# Patient Record
Sex: FEMALE | URBAN - METROPOLITAN AREA
[De-identification: names, ages, dates, MRNs, and addresses within clinical notes are randomized per-mention and may not be internally consistent; named-entity substitution may affect disease eponyms.]

---

## 2020-10-22 ENCOUNTER — APPOINTMENT (RX ONLY)
Dept: URBAN - METROPOLITAN AREA CLINIC 134 | Facility: CLINIC | Age: 55
Setting detail: DERMATOLOGY
End: 2020-10-22

## 2020-10-22 VITALS — TEMPERATURE: 97.4 F

## 2020-10-22 DIAGNOSIS — L82.1 OTHER SEBORRHEIC KERATOSIS: ICD-10-CM

## 2020-10-22 PROCEDURE — ? COUNSELING

## 2020-10-22 PROCEDURE — ? OTHER

## 2020-10-22 PROCEDURE — 99202 OFFICE O/P NEW SF 15 MIN: CPT

## 2020-10-22 ASSESSMENT — LOCATION DETAILED DESCRIPTION DERM
LOCATION DETAILED: LEFT LATERAL BUCCAL CHEEK
LOCATION DETAILED: LEFT LATERAL MANDIBULAR CHEEK

## 2020-10-22 ASSESSMENT — PAIN INTENSITY VAS: HOW INTENSE IS YOUR PAIN 0 BEING NO PAIN, 10 BEING THE MOST SEVERE PAIN POSSIBLE?: NO PAIN

## 2020-10-22 ASSESSMENT — LOCATION SIMPLE DESCRIPTION DERM: LOCATION SIMPLE: LEFT CHEEK

## 2020-10-22 ASSESSMENT — LOCATION ZONE DERM: LOCATION ZONE: FACE

## 2020-10-22 NOTE — PROCEDURE: OTHER
Detail Level: Detailed
Note Text (......Xxx Chief Complaint.): This diagnosis correlates with the
Other (Free Text): Courtesy cryodestruction with LN x 2

## 2021-10-12 ENCOUNTER — APPOINTMENT (RX ONLY)
Dept: URBAN - METROPOLITAN AREA CLINIC 134 | Facility: CLINIC | Age: 56
Setting detail: DERMATOLOGY
End: 2021-10-12

## 2021-10-12 DIAGNOSIS — L82.1 OTHER SEBORRHEIC KERATOSIS: ICD-10-CM

## 2021-10-12 PROBLEM — D48.5 NEOPLASM OF UNCERTAIN BEHAVIOR OF SKIN: Status: ACTIVE | Noted: 2021-10-12

## 2021-10-12 PROCEDURE — 99212 OFFICE O/P EST SF 10 MIN: CPT | Mod: 25

## 2021-10-12 PROCEDURE — 69100 BIOPSY OF EXTERNAL EAR: CPT

## 2021-10-12 PROCEDURE — ? BIOPSY BY SHAVE METHOD

## 2021-10-12 PROCEDURE — ? COUNSELING

## 2021-10-12 ASSESSMENT — LOCATION DETAILED DESCRIPTION DERM: LOCATION DETAILED: RIGHT CENTRAL ZYGOMA

## 2021-10-12 ASSESSMENT — LOCATION SIMPLE DESCRIPTION DERM: LOCATION SIMPLE: RIGHT ZYGOMA

## 2021-10-12 ASSESSMENT — LOCATION ZONE DERM: LOCATION ZONE: FACE

## 2021-10-12 NOTE — PROCEDURE: BIOPSY BY SHAVE METHOD
Detail Level: Detailed
Depth Of Biopsy: dermis
Was A Bandage Applied: Yes
Size Of Lesion In Cm: 1
X Size Of Lesion In Cm: 0
Biopsy Type: H and E
Biopsy Method: Dermablade
Anesthesia Type: 1% lidocaine with epinephrine
Anesthesia Volume In Cc (Will Not Render If 0): 0.5
Hemostasis: Drysol
Wound Care: Petrolatum
Dressing: bandage
Destruction After The Procedure: No
Type Of Destruction Used: Curettage
Curettage Text: The wound bed was treated with curettage after the biopsy was performed.
Cryotherapy Text: The wound bed was treated with cryotherapy after the biopsy was performed.
Electrodesiccation Text: The wound bed was treated with electrodesiccation after the biopsy was performed.
Electrodesiccation And Curettage Text: The wound bed was treated with electrodesiccation and curettage after the biopsy was performed.
Silver Nitrate Text: The wound bed was treated with silver nitrate after the biopsy was performed.
Lab: [id number]
Consent: Verbal consent was obtained and risks were reviewed including but not limited to scarring, infection, bleeding, scabbing, incomplete removal, nerve damage and allergy to anesthesia.
Post-Care Instructions: I reviewed with the patient in detail post-care instructions. Patient is to keep the biopsy site dry overnight, and then apply bacitracin twice daily until healed. Patient may apply hydrogen peroxide soaks to remove any crusting.
Notification Instructions: Patient will be notified of biopsy results. However, patient instructed to call the office if not contacted within 2 weeks.
Billing Type: Third-Party Bill
Information: Selecting Yes will display possible errors in your note based on the variables you have selected. This validation is only offered as a suggestion for you. PLEASE NOTE THAT THE VALIDATION TEXT WILL BE REMOVED WHEN YOU FINALIZE YOUR NOTE. IF YOU WANT TO FAX A PRELIMINARY NOTE YOU WILL NEED TO TOGGLE THIS TO 'NO' IF YOU DO NOT WANT IT IN YOUR FAXED NOTE.

## 2021-10-12 NOTE — PROCEDURE: MIPS QUALITY
Quality 110: Preventive Care And Screening: Influenza Immunization: Influenza Immunization previously received during influenza season
Detail Level: Detailed
Additional Notes: Covid vaccine received

## 2021-10-20 ENCOUNTER — APPOINTMENT (RX ONLY)
Dept: URBAN - METROPOLITAN AREA CLINIC 134 | Facility: CLINIC | Age: 56
Setting detail: DERMATOLOGY
End: 2021-10-20

## 2021-10-20 ENCOUNTER — RX ONLY (OUTPATIENT)
Age: 56
Setting detail: RX ONLY
End: 2021-10-20

## 2021-10-20 DIAGNOSIS — S0182XA OPEN WOUND OF OTHER AND MULTIPLE SITES OF FACE, COMPLICATED: ICD-10-CM

## 2021-10-20 DIAGNOSIS — L01.01 NON-BULLOUS IMPETIGO: ICD-10-CM

## 2021-10-20 PROBLEM — S01.302A UNSPECIFIED OPEN WOUND OF LEFT EAR, INITIAL ENCOUNTER: Status: ACTIVE | Noted: 2021-10-20

## 2021-10-20 PROCEDURE — ? PRESCRIPTION

## 2021-10-20 PROCEDURE — ? COUNSELING

## 2021-10-20 PROCEDURE — 99213 OFFICE O/P EST LOW 20 MIN: CPT

## 2021-10-20 PROCEDURE — ? TREATMENT REGIMEN

## 2021-10-20 RX ORDER — MUPIROCIN 20 MG/G
OINTMENT TOPICAL
Qty: 22 | Refills: 0 | Status: ERX

## 2021-10-20 RX ORDER — CIPROFLOXACIN HYDROCHLORIDE 500 MG/1
TABLET, FILM COATED ORAL
Qty: 14 | Refills: 0 | Status: ERX

## 2021-10-20 RX ORDER — MUPIROCIN 20 MG/G
OINTMENT TOPICAL
Qty: 22 | Refills: 0 | Status: ERX | COMMUNITY
Start: 2021-10-20

## 2021-10-20 RX ORDER — CIPROFLOXACIN HYDROCHLORIDE 500 MG/1
TABLET, FILM COATED ORAL
Qty: 14 | Refills: 0 | Status: ERX | COMMUNITY
Start: 2021-10-20

## 2021-10-20 RX ADMIN — CIPROFLOXACIN HYDROCHLORIDE: 500 TABLET, FILM COATED ORAL at 00:00

## 2021-10-20 RX ADMIN — MUPIROCIN: 20 OINTMENT TOPICAL at 00:00

## 2021-10-20 ASSESSMENT — LOCATION ZONE DERM: LOCATION ZONE: EAR

## 2021-10-20 ASSESSMENT — LOCATION SIMPLE DESCRIPTION DERM: LOCATION SIMPLE: LEFT EAR

## 2021-10-20 ASSESSMENT — LOCATION DETAILED DESCRIPTION DERM: LOCATION DETAILED: LEFT CAVUM CONCHA

## 2021-10-20 NOTE — PROCEDURE: MIPS QUALITY
Additional Notes: The patient has not received the COVID vaccine
Detail Level: Detailed
Quality 110: Preventive Care And Screening: Influenza Immunization: Influenza immunization was not ordered or administered, reason not given

## 2024-04-05 ENCOUNTER — APPOINTMENT (RX ONLY)
Dept: URBAN - METROPOLITAN AREA CLINIC 134 | Facility: CLINIC | Age: 59
Setting detail: DERMATOLOGY
End: 2024-04-05

## 2024-04-05 DIAGNOSIS — L81.4 OTHER MELANIN HYPERPIGMENTATION: ICD-10-CM

## 2024-04-05 DIAGNOSIS — L44.8 OTHER SPECIFIED PAPULOSQUAMOUS DISORDERS: ICD-10-CM

## 2024-04-05 DIAGNOSIS — D22 MELANOCYTIC NEVI: ICD-10-CM

## 2024-04-05 PROBLEM — D22.5 MELANOCYTIC NEVI OF TRUNK: Status: ACTIVE | Noted: 2024-04-05

## 2024-04-05 PROCEDURE — ? OTHER

## 2024-04-05 PROCEDURE — 99212 OFFICE O/P EST SF 10 MIN: CPT

## 2024-04-05 PROCEDURE — ? COUNSELING

## 2024-04-05 ASSESSMENT — LOCATION SIMPLE DESCRIPTION DERM
LOCATION SIMPLE: LEFT SHOULDER
LOCATION SIMPLE: CHEST
LOCATION SIMPLE: UPPER BACK

## 2024-04-05 ASSESSMENT — LOCATION DETAILED DESCRIPTION DERM
LOCATION DETAILED: SUPERIOR THORACIC SPINE
LOCATION DETAILED: LEFT MEDIAL SUPERIOR CHEST
LOCATION DETAILED: LEFT POSTERIOR SHOULDER

## 2024-04-05 ASSESSMENT — LOCATION ZONE DERM
LOCATION ZONE: ARM
LOCATION ZONE: TRUNK

## 2024-04-05 NOTE — PROCEDURE: OTHER
Render Risk Assessment In Note?: no
Note Text (......Xxx Chief Complaint.): This diagnosis correlates with the
Other (Free Text): If the lesion returns patient can RTC for ln2 treatment
Detail Level: Simple

## 2024-05-22 NOTE — PROCEDURE: COUNSELING
Detail Level: Zone
Render In Strict Bullet Format?: No
Initiate Treatment: OTC cLn shampoo
Detail Level: Generalized
Patient Specific Counseling (Will Not Stick From Patient To Patient): Courtesy cryotherapy at today’s visit